# Patient Record
Sex: MALE | Race: WHITE
[De-identification: names, ages, dates, MRNs, and addresses within clinical notes are randomized per-mention and may not be internally consistent; named-entity substitution may affect disease eponyms.]

---

## 2018-11-06 ENCOUNTER — HOSPITAL ENCOUNTER (OUTPATIENT)
Dept: HOSPITAL 92 - SCSER | Age: 53
Setting detail: OBSERVATION
LOS: 1 days | Discharge: HOME | End: 2018-11-07
Attending: INTERNAL MEDICINE | Admitting: INTERNAL MEDICINE
Payer: SELF-PAY

## 2018-11-06 VITALS — BODY MASS INDEX: 45 KG/M2

## 2018-11-06 DIAGNOSIS — Z79.899: ICD-10-CM

## 2018-11-06 DIAGNOSIS — I10: ICD-10-CM

## 2018-11-06 DIAGNOSIS — E78.5: ICD-10-CM

## 2018-11-06 DIAGNOSIS — R07.9: Primary | ICD-10-CM

## 2018-11-06 DIAGNOSIS — Z79.82: ICD-10-CM

## 2018-11-06 LAB
ALBUMIN SERPL BCG-MCNC: 4.2 G/DL (ref 3.5–5)
ALP SERPL-CCNC: 73 U/L (ref 40–150)
ALT SERPL W P-5'-P-CCNC: 53 U/L (ref 8–55)
ANION GAP SERPL CALC-SCNC: 15 MMOL/L (ref 10–20)
AST SERPL-CCNC: 34 U/L (ref 5–34)
BASOPHILS # BLD AUTO: 0.1 THOU/UL (ref 0–0.2)
BASOPHILS NFR BLD AUTO: 1.3 % (ref 0–1)
BILIRUB SERPL-MCNC: 0.3 MG/DL (ref 0.2–1.2)
BUN SERPL-MCNC: 14 MG/DL (ref 8.4–25.7)
CALCIUM SERPL-MCNC: 9.4 MG/DL (ref 7.8–10.44)
CHLORIDE SERPL-SCNC: 110 MMOL/L (ref 98–107)
CK MB SERPL-MCNC: 4.7 NG/ML (ref 0–6.6)
CO2 SERPL-SCNC: 19 MMOL/L (ref 22–29)
CREAT CL PREDICTED SERPL C-G-VRATE: 0 ML/MIN (ref 70–130)
EOSINOPHIL # BLD AUTO: 0.2 THOU/UL (ref 0–0.7)
EOSINOPHIL NFR BLD AUTO: 2.3 % (ref 0–10)
GLOBULIN SER CALC-MCNC: 3.5 G/DL (ref 2.4–3.5)
GLUCOSE SERPL-MCNC: 101 MG/DL (ref 70–105)
HGB BLD-MCNC: 15.2 G/DL (ref 14–18)
LYMPHOCYTES # BLD: 2.5 THOU/UL (ref 1.2–3.4)
LYMPHOCYTES NFR BLD AUTO: 28.5 % (ref 21–51)
MCH RBC QN AUTO: 29.5 PG (ref 27–31)
MCV RBC AUTO: 90 FL (ref 78–98)
MONOCYTES # BLD AUTO: 0.8 THOU/UL (ref 0.11–0.59)
MONOCYTES NFR BLD AUTO: 8.9 % (ref 0–10)
NEUTROPHILS # BLD AUTO: 5.2 THOU/UL (ref 1.4–6.5)
NEUTROPHILS NFR BLD AUTO: 59 % (ref 42–75)
PLATELET # BLD AUTO: 240 THOU/UL (ref 130–400)
POTASSIUM SERPL-SCNC: 4.8 MMOL/L (ref 3.5–5.1)
RBC # BLD AUTO: 5.14 MILL/UL (ref 4.7–6.1)
SODIUM SERPL-SCNC: 139 MMOL/L (ref 136–145)
TROPONIN I SERPL DL<=0.01 NG/ML-MCNC: (no result) NG/ML (ref ?–0.03)
TROPONIN I SERPL DL<=0.01 NG/ML-MCNC: (no result) NG/ML (ref ?–0.03)
WBC # BLD AUTO: 8.8 THOU/UL (ref 4.8–10.8)

## 2018-11-06 PROCEDURE — 94660 CPAP INITIATION&MGMT: CPT

## 2018-11-06 PROCEDURE — 82553 CREATINE MB FRACTION: CPT

## 2018-11-06 PROCEDURE — 83880 ASSAY OF NATRIURETIC PEPTIDE: CPT

## 2018-11-06 PROCEDURE — 80061 LIPID PANEL: CPT

## 2018-11-06 PROCEDURE — 85379 FIBRIN DEGRADATION QUANT: CPT

## 2018-11-06 PROCEDURE — 83690 ASSAY OF LIPASE: CPT

## 2018-11-06 PROCEDURE — 93017 CV STRESS TEST TRACING ONLY: CPT

## 2018-11-06 PROCEDURE — 80048 BASIC METABOLIC PNL TOTAL CA: CPT

## 2018-11-06 PROCEDURE — 36415 COLL VENOUS BLD VENIPUNCTURE: CPT

## 2018-11-06 PROCEDURE — G0378 HOSPITAL OBSERVATION PER HR: HCPCS

## 2018-11-06 PROCEDURE — 93005 ELECTROCARDIOGRAM TRACING: CPT

## 2018-11-06 PROCEDURE — 78452 HT MUSCLE IMAGE SPECT MULT: CPT

## 2018-11-06 PROCEDURE — 94640 AIRWAY INHALATION TREATMENT: CPT

## 2018-11-06 PROCEDURE — 96376 TX/PRO/DX INJ SAME DRUG ADON: CPT

## 2018-11-06 PROCEDURE — 85025 COMPLETE CBC W/AUTO DIFF WBC: CPT

## 2018-11-06 PROCEDURE — 80053 COMPREHEN METABOLIC PANEL: CPT

## 2018-11-06 PROCEDURE — 94760 N-INVAS EAR/PLS OXIMETRY 1: CPT

## 2018-11-06 PROCEDURE — 84484 ASSAY OF TROPONIN QUANT: CPT

## 2018-11-06 PROCEDURE — 99406 BEHAV CHNG SMOKING 3-10 MIN: CPT

## 2018-11-06 PROCEDURE — 96375 TX/PRO/DX INJ NEW DRUG ADDON: CPT

## 2018-11-06 PROCEDURE — 96361 HYDRATE IV INFUSION ADD-ON: CPT

## 2018-11-06 PROCEDURE — A9500 TC99M SESTAMIBI: HCPCS

## 2018-11-06 PROCEDURE — 71045 X-RAY EXAM CHEST 1 VIEW: CPT

## 2018-11-06 PROCEDURE — 96374 THER/PROPH/DIAG INJ IV PUSH: CPT

## 2018-11-06 PROCEDURE — 80306 DRUG TEST PRSMV INSTRMNT: CPT

## 2018-11-06 RX ADMIN — Medication SCH: at 21:12

## 2018-11-06 NOTE — RAD
PORTABLE CHEST ONE VIEW:

 

Date: 11-6-18 

Time: 2:33 p.m.

 

History: Chest pain, shortness of breath. 

 

FINDINGS: 

The heart size is normal. The lungs are expanded without focal areas of consolidation, pneumothoraces
, tyson pulmonary edema or pleural effusions. 

 

IMPRESSION: 

No radiographic evidence of acute cardiopulmonary process.  

 

POS: [facility]

## 2018-11-06 NOTE — ULT
VENOUS DOPPLER ULTRASOUND OF THE LEFT LOWER EXTREMITY:

 

Date:  11/06/18 

 

HISTORY:  

Left leg pain and shortness of breath. 

 

TECHNIQUE:  

Gray scale ultrasound with color flow and spectral Doppler imaging of the deep venous systems of the 
left lower extremity is performed. 

 

FINDINGS:

There is good flow, compression, and augmentation noted in the left common femoral, femoral, deep fem
oral, popliteal, posterior tibial, and greater saphenous veins. 

 

IMPRESSION: 

No evidence of deep venous thrombosis in the left lower extremity. 

 

 

POS: [facility]

## 2018-11-07 VITALS — TEMPERATURE: 97.9 F | SYSTOLIC BLOOD PRESSURE: 139 MMHG | DIASTOLIC BLOOD PRESSURE: 82 MMHG

## 2018-11-07 LAB
ANION GAP SERPL CALC-SCNC: 14 MMOL/L (ref 10–20)
BASOPHILS # BLD AUTO: 0 THOU/UL (ref 0–0.2)
BASOPHILS NFR BLD AUTO: 0.5 % (ref 0–1)
BUN SERPL-MCNC: 19 MG/DL (ref 8.4–25.7)
CALCIUM SERPL-MCNC: 9.3 MG/DL (ref 7.8–10.44)
CHD RISK SERPL-RTO: 5.6 (ref ?–4.5)
CHLORIDE SERPL-SCNC: 107 MMOL/L (ref 98–107)
CHOLEST SERPL-MCNC: 202 MG/DL
CO2 SERPL-SCNC: 21 MMOL/L (ref 22–29)
CREAT CL PREDICTED SERPL C-G-VRATE: 159 ML/MIN (ref 70–130)
DRUG SCREEN CUTOFF: (no result)
EOSINOPHIL # BLD AUTO: 0.1 THOU/UL (ref 0–0.7)
EOSINOPHIL NFR BLD AUTO: 1 % (ref 0–10)
GLUCOSE SERPL-MCNC: 108 MG/DL (ref 70–105)
HDLC SERPL-MCNC: 36 MG/DL
HGB BLD-MCNC: 15.8 G/DL (ref 14–18)
LDLC SERPL CALC-MCNC: 104 MG/DL
LYMPHOCYTES # BLD: 1.2 THOU/UL (ref 1.2–3.4)
LYMPHOCYTES NFR BLD AUTO: 14.4 % (ref 21–51)
MCH RBC QN AUTO: 31.3 PG (ref 27–31)
MCV RBC AUTO: 93.9 FL (ref 78–98)
MEDTOX CONTROL LINE VALID?: (no result)
MEDTOX READER #: (no result)
MONOCYTES # BLD AUTO: 0.2 THOU/UL (ref 0.11–0.59)
MONOCYTES NFR BLD AUTO: 2.9 % (ref 0–10)
NEUTROPHILS # BLD AUTO: 6.5 THOU/UL (ref 1.4–6.5)
NEUTROPHILS NFR BLD AUTO: 81.3 % (ref 42–75)
PLATELET # BLD AUTO: 272 THOU/UL (ref 130–400)
POTASSIUM SERPL-SCNC: 4.4 MMOL/L (ref 3.5–5.1)
RBC # BLD AUTO: 5.06 MILL/UL (ref 4.7–6.1)
SODIUM SERPL-SCNC: 138 MMOL/L (ref 136–145)
TRIGL SERPL-MCNC: 308 MG/DL (ref ?–150)
WBC # BLD AUTO: 8 THOU/UL (ref 4.8–10.8)

## 2018-11-07 RX ADMIN — Medication SCH: at 09:25
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
## 2018-11-07 NOTE — NM
CARDIAC SPECT:

 

CLINICAL HISTORY:

52-year-old male with chest pain, COPD, hypertension, dyslipidemia, and smoker. 

 

TECHNIQUE:  

A stress-only myocardial perfusion scan was performed following the intravenous administration of 27 
mCi technetium-99m sestamibi. Pharmacologic stress with Lexiscan was monitored and interpreted by Dr. Castillo. 

 

FINDINGS:

Homogeneous tracer distribution is seen in the myocardial segments on the post stress images. 

 

GATED SPECT LVEF:

73%.

 

WALL MOTION EXAM:

Normal. 

 

IMPRESSION: 

Normal post stress myocardial perfusion scan. 

 

 

 

POS: [facility]

## 2018-11-08 NOTE — DIS
DATE OF ADMISSION:  11/06/2018

 

DATE OF DISCHARGE:  11/07/2018

 

DISCHARGE DIAGNOSES:

1.  Chest pain, likely musculoskeletal.

2.  Hypertension, stable.

3.  Tobacco use.

4.  Hyperlipidemia.

 

CONSULTATIONS:  None.

 

PERTINENT LABORATORY AND X-RAY FINDINGS:  Basic metabolic profile within normal limits.  Troponin I n
egative x2.  Total cholesterol 202, triglycerides 308, HDL 36, , lipase 37.  CBC within normal
 limits.  Urine drug screen dated 11/07/2018 negative.  Portable chest x-ray dated 11/06/2018 showed 
no acute cardiopulmonary process.  Left lower extremity venous Doppler study dated 11/06/2018 showed 
no evidence for DVT.  Cardiolite stress test dated 11/07/2018 showed no evidence of reversible or fix
ed ischemia.

 

HOSPITAL COURSE:  The patient was observed on the telemetry unit after initially presenting with ches
t pain.  The patient underwent serial cardiac enzymes which were negative x2 proceeding to exercise C
ardiolite stress testing showing no evidence of reversible or fixed ischemia.  Telemetry monitoring s
howed a sinus mechanism without evidence of acute arrhythmia or dysrhythmia.  The patient was noted w
ith hyperlipidemia with primary hypertriglyceridemia with recommendations for ongoing management in a
n outpatient setting.  I have examined the patient at the time of discharge and discussed followup in
structions.  The patient is ready for discharge on 11/07/2018.

 

DISCHARGE MEDICATIONS:

1.  Amlodipine 5 mg p.o. daily.

2.  Aspirin 81 mg p.o. daily.

3.  Cozaar 100 mg p.o. daily.

4.  Lovastatin 10 mg p.o. at bedtime.

5.  Omeprazole 20 mg p.o. daily.

6.  Tylenol 650 mg p.o. q.4-6 hours p.r.n.

 

FOLLOWUP:  The patient to follow up with Dr. Jose R Forde within 7 days of discharge.

 

CONDITION ON DISCHARGE:  Stable.

 

ACTIVITY:  Ad marky.

 

DIET:  Heart healthy.

 

CODE STATUS:  FULL.

 

DISPOSITION:  Home on 11/07/2018.